# Patient Record
Sex: FEMALE | Race: WHITE | Employment: FULL TIME | URBAN - METROPOLITAN AREA
[De-identification: names, ages, dates, MRNs, and addresses within clinical notes are randomized per-mention and may not be internally consistent; named-entity substitution may affect disease eponyms.]

---

## 2023-05-31 ENCOUNTER — OFFICE VISIT (OUTPATIENT)
Dept: PODIATRY | Facility: CLINIC | Age: 63
End: 2023-05-31
Payer: COMMERCIAL

## 2023-05-31 VITALS
DIASTOLIC BLOOD PRESSURE: 72 MMHG | WEIGHT: 160 LBS | HEIGHT: 68 IN | SYSTOLIC BLOOD PRESSURE: 110 MMHG | BODY MASS INDEX: 24.25 KG/M2

## 2023-05-31 DIAGNOSIS — M72.2 PLANTAR FASCIITIS, LEFT: ICD-10-CM

## 2023-05-31 DIAGNOSIS — M21.42 PES PLANUS OF LEFT FOOT: ICD-10-CM

## 2023-05-31 DIAGNOSIS — M79.672 LEFT FOOT PAIN: Primary | ICD-10-CM

## 2023-05-31 PROCEDURE — 99203 OFFICE O/P NEW LOW 30 MIN: CPT | Performed by: PODIATRIST

## 2023-05-31 RX ORDER — DICLOFENAC SODIUM 75 MG/1
75 TABLET, DELAYED RELEASE ORAL 2 TIMES DAILY
Qty: 28 TABLET | Refills: 0 | Status: SHIPPED | OUTPATIENT
Start: 2023-05-31 | End: 2023-06-14

## 2023-05-31 NOTE — LETTER
5/31/2023         RE: Krys Martinez  [address]  [facility] [address]        Dear Colleague,    Thank you for referring your patient, Krys Martinez, to the [facility]. Please see a copy of my visit note below.    PATIENT HISTORY: Krys Martinez is a 62 year old female who presents to clinic for pain to the left plantar medial heel.  Notes its been going on for a few weeks.  She had Planter fasciitis years ago and had custom inserts which seem to help a lot.  She has not been able to find them as she has not been wearing them for quite a while since the pain went away.  Pain can be 7 out of 10 at its worst.  Denies specific injury.  Wondering what can be done for the heel pain.    Review of Systems:  Patient denies fever, chills, rash, wound, stiffness,  numbness, weakness, heart burn, blood in stool, chest pain with activity, calf pain when walking, shortness of breath with activity, chronic cough, easy bleeding/bruising, swelling of ankles, excessive thirst, fatigue, depression, anxiety.  Patient admits to limping.     PAST MEDICAL HISTORY: No past medical history on file.     PAST SURGICAL HISTORY: No past surgical history on file.     MEDICATIONS: No current outpatient medications on file.     ALLERGIES:  Not on File     SOCIAL HISTORY:   Social History     Socioeconomic History     Marital status:      Spouse name: Not on file     Number of children: Not on file     Years of education: Not on file     Highest education level: Not on file   Occupational History     Not on file   Tobacco Use     Smoking status: Not on file     Smokeless tobacco: Not on file   Substance and Sexual Activity     Alcohol use: Not on file     Drug use: Not on file     Sexual activity: Not on file   Other Topics Concern     Not on file   Social History Narrative     Not on file     Social Determinants of Health     Financial Resource Strain: Not on file   Food Insecurity: Not on  file   Transportation Needs: Not on file   Physical Activity: Not on file   Stress: Not on file   Social Connections: Not on file   Intimate Partner Violence: Not on file   Housing Stability: Not on file        FAMILY HISTORY: No family history on file.     EXAM:Vitals: There were no vitals taken for this visit.  BMI= There is no height or weight on file to calculate BMI.      General appearance: Patient is alert and fully cooperative with history & exam.  No sign of distress is noted during the visit.     Psychiatric: Affect is pleasant & appropriate.  Patient appears motivated to improve health.     Respiratory: Breathing is regular & unlabored while sitting.     HEENT: Hearing is intact to spoken word.  Speech is clear.  No gross evidence of visual impairment that would impact ambulation.     Dermatologic: Skin is intact to both lower extremities without significant lesions, rash or abrasion.  No paronychia or evidence of soft tissue infection is noted.     Vascular: DP & PT pulses are intact & regular bilaterally.  No significant edema or varicosities noted.  CFT and skin temperature is normal to both lower extremities.     Neurologic: Lower extremity sensation is intact to light touch.  No evidence of weakness or contracture in the lower extremities.  No evidence of neuropathy.     Musculoskeletal: Patient is ambulatory without assistive device or brace.  Decrease arch height. Pain on palpation of the left plantar medial heel.    ASSESSMENT:    Left foot pain  Plantar fasciitis, left  Pes planus of left foot       Medical Decision Making/Plan:  Reviewed patient's chart in [facility]. The potential causes and nature of plantar fasciitis were discussed with the patient.  We reviewed the natural history/prognosis of the condition and risks if left untreated.  These include chronic pain, other sites of pain due to gait changes, and potential plantar fascial rupture.      We discussed possible causes of the condition as  it relates to the patients specific situation.      Conservative treatment options were reviewed:  appropriate shoes, avoidance of barefoot walking, inserts/orthoses, stretching, ice, massage, immobilization and NSAIDs.     We also reviewed the options of injection therapy and surgery.  However, it was made clear that surgery is only considered when conservative therapy fails.  The risks and benefits of injection therapy, and surgery were discussed.     After thorough discussion and answering all questions, the patient elected to try custom inserts.  She was given an order for this.  She had them years ago and they helped a lot.  She will also continue doing stretching and icing.  We will try an stronger oral anti-inflammatory for short course to see if this helps knock down some of the inflammation.  Recommend not going barefoot or in socks and having supportive shoe or sandal on at all times.    If pain continues we can always try an injection or boot.  All questions were answered to patient's satisfaction and she will call further questions or concerns..       Patient risk factor: Patient is at low risk for infection  .        Patrizia Shaver DPM, Podiatry/Foot and Ankle Surgery        Again, thank you for allowing me to participate in the care of your patient.        Sincerely,        Patrizia Shaver DPM, Podiatry/Foot and Ankle Surgery

## 2023-05-31 NOTE — PATIENT INSTRUCTIONS
Thank you for choosing [facility] Podiatry / Foot & Ankle Surgery!    DR CORTES'S CLINIC:  [facility]   [address]   [place] [address]      TRIAGE LINE: [phone]  APPOINTMENTS: [phone]  RADIOLOGY: [phone]  SET UP SURGERY: [phone]  PHYSICAL THERAPY: [phone]   FAX NUMBER: [phone]  BILLING QUESTIONS: [phone]       Follow up: Call in 4-6 weeks if not improved    Super Feet- Green    PLANTAR FASCIITIS  Plantar fasciitis is often referred to as heel spurs or heel pain. Plantar fasciitis is a very common problem that affects people of all foot shapes, age, weight and activity level. Pain may be in the arch or on the weight-bearing surface of the heel. The pain may come on without injury or identifiable cause. Pain is generally present when first getting out of bed in the morning or up from a seated break.     CAUSES  The plantar fascia is a dense fibrous band of tissue that stretches across the bottom surface of the foot. The fascia helps support the foot muscles and arch. Plantar fasciitis is thought to be caused by mechanical strain or overload. Frequent walking without shoes or wearing unsupportive shoes is thought to cause structural overload and ultimately inflammation of the plantar fascia. Some people have heel spurs that can be seen on x-ray. The heel spur is actually a minor component of plantar fascitis and is largely ignored.       SELF TREATMENT   The easiest solution is to stop walking around your home without shoes. Plantar fasciitis is largely a shoe problem. Shoes are either not being worn often enough or your current shoes are inadequate for your weight, foot structure or activity level. The majority of shoes on the market today are not sufficient to resist development of plantar fasciitis or to promote healing. Assume that your current shoes are inadequate and will need to be replaced. Even high quality shoes wear out with 6 months to  one year of frequent use. Weight loss is another option. Losing ten pounds in the next two months may be enough to resolve the problem. Ice applied to the area of pain two to three times per day for ten minutes each session can be very helpful. Warm foot soaks in epsom salts can also relieve pain. This should continue until the problem resolves. Achilles tendon stretching is essential. Stretch multiple times daily to promote healing and to prevent recurrence in the future. Over all stretching of the body is helpful as well such as the calves, thighs and lower back. Normally when one area of the body is tight, other areas are too. Gentle Yoga can be good for this.     Over the counter topical anti inflammatories can be helpful such as biofreeze, bengay, salon pas, ect...  Oral ibuprofen or aleve is recommended as well to try to calm down inflammation.     Night splints can be helpful to gradually stretch the foot at night as a lot of pain is when you get up in the morning. Taking a towel or thera band and stretching the foot back multiple times before you get ou of bed can be beneficial as well.     MEDICAL TREATMENT  Medical treatments often include custom arch supports, cortisone injections, physical therapy, splints to be worn in bed, prescription medications and surgery. The home treatments listed above will be necessary regardless of these advanced medical treatments. Surgery is rarely needed but is very helpful in selected cases.     PROGNOSIS  Plantar fasciitis can last from one day to a lifetime. Some people get intermittent fascitis that is very short-lived. Others suffer daily for years. Excessive body weight, frequent bare foot walking, long hours on the feet, inadequate shoes, predisposing foot structures and excessive activity such as running are all potential issues that lead to chronic and/or recurring plantar fascitis. Having plantar fasciitis means that you are forever prone to this problem and will  require modification of some of the above factors. Most people seek treatment within one to four months. Healing usually requires a similar one to four month time frame. Healing time is relative to the amount of effort spent treating the problem.   Plantar fasciitis is highly recurrent. Risk factors often continue, including return to bare foot walking, inadequate shoes, excessive body weight, excessive activities, etc. Your life style and foot structure may predispose you to recurrent plantar fasciitis. A daily prevention regimen can be very helpful. Ongoing use of shoe inserts, careful attention to appropriate shoes, daily Achilles stretching, etc. may prevent recurrence. Prompt attention at the earliest warning signs of heel pain can resolve the problem in as short as a few days.     EXERCISES  Stair Exercise: Step on the stairs with the ball of your foot and hold your position for at least 15 seconds, then slowly step down with the heels of your foot. You can do this daily and as often as you want.   Picking the Towel: Sit comfortably and then pick the towel up with your toes. You can use any object other than a towel as long as the material can be soft and you can pick it up with your toes.  Rolling the Bottle: Use a small ball or frozen water bottle and then roll it around with your foot.   Flex the Toes: Sit comfortably and then flex your toes by pointing it towards the floor or towards your body. This will relax and flex your foot and exercise your plantar fascia, the calf, and the Achilles tendon. The inability of the foot to stretch often causes the bunching up of the plantar fascia area leading to the pain.  Calf/Achilles Stretching: Lay on you back and raise one foot, then point your toes towards the floor. See photo below:               Hold each stretch for 10 seconds. Stretch 10 times per set, three sets per day. Morning, afternoon and evening. If your heel pain is very severe in the morning, consider  doing the first set of stretches before you get out of bed.      OVER THE COUNTER INSERT RECOMMENDATIONS  SuperFeet   Sofsole Fit Spenco   Power Step   Walk-Fit Arch Cradles     Most of these can be found at your local [employer], or online.  **A good high quality over the counter insert should cost around $40-$50      [facility] [employer] LOCATIONS  [place]  [address]  [phone]   [place] [address]  [phone] [place]  [address]  [phone]   [place]  [address]  [phone]   [place]  [address]  [phone] [place] [address]  [phone]   [place]  [address]  [phone]   [place]  [address] [place] [address]  [phone] [place]  [address]  [phone]      [facility]  [address]  [place] [address]  Phone: [phone]  Fax: [phone] [facility]  [address]  [place] [address]  Phone: [phone]   Fax: [phone]   [facility]  [address]  [place] [address]  Phone: [phone]  Fax: [phone] [facility]  [address]  [place] [address]  Phone: [phone]   Fax: [phone]   [facility]   [address]  [place] [address]  Phone: [phone]  Fax: [phone] * Please call any location listed to make an appointment for a casting/fitting. Your referral was sent to their central office and they will all have the order on file.

## 2023-05-31 NOTE — PROGRESS NOTES
PATIENT HISTORY: Krys Martinez is a 62 year old female who presents to clinic for pain to the left plantar medial heel.  Notes its been going on for a few weeks.  She had Planter fasciitis years ago and had custom inserts which seem to help a lot.  She has not been able to find them as she has not been wearing them for quite a while since the pain went away.  Pain can be 7 out of 10 at its worst.  Denies specific injury.  Wondering what can be done for the heel pain.    Review of Systems:  Patient denies fever, chills, rash, wound, stiffness,  numbness, weakness, heart burn, blood in stool, chest pain with activity, calf pain when walking, shortness of breath with activity, chronic cough, easy bleeding/bruising, swelling of ankles, excessive thirst, fatigue, depression, anxiety.  Patient admits to limping.     PAST MEDICAL HISTORY: No past medical history on file.     PAST SURGICAL HISTORY: No past surgical history on file.     MEDICATIONS: No current outpatient medications on file.     ALLERGIES:  Not on File     SOCIAL HISTORY:   Social History     Socioeconomic History     Marital status:      Spouse name: Not on file     Number of children: Not on file     Years of education: Not on file     Highest education level: Not on file   Occupational History     Not on file   Tobacco Use     Smoking status: Not on file     Smokeless tobacco: Not on file   Substance and Sexual Activity     Alcohol use: Not on file     Drug use: Not on file     Sexual activity: Not on file   Other Topics Concern     Not on file   Social History Narrative     Not on file     Social Determinants of Health     Financial Resource Strain: Not on file   Food Insecurity: Not on file   Transportation Needs: Not on file   Physical Activity: Not on file   Stress: Not on file   Social Connections: Not on file   Intimate Partner Violence: Not on file   Housing Stability: Not on file        FAMILY HISTORY: No family history on file.      EXAM:Vitals: There were no vitals taken for this visit.  BMI= There is no height or weight on file to calculate BMI.      General appearance: Patient is alert and fully cooperative with history & exam.  No sign of distress is noted during the visit.     Psychiatric: Affect is pleasant & appropriate.  Patient appears motivated to improve health.     Respiratory: Breathing is regular & unlabored while sitting.     HEENT: Hearing is intact to spoken word.  Speech is clear.  No gross evidence of visual impairment that would impact ambulation.     Dermatologic: Skin is intact to both lower extremities without significant lesions, rash or abrasion.  No paronychia or evidence of soft tissue infection is noted.     Vascular: DP & PT pulses are intact & regular bilaterally.  No significant edema or varicosities noted.  CFT and skin temperature is normal to both lower extremities.     Neurologic: Lower extremity sensation is intact to light touch.  No evidence of weakness or contracture in the lower extremities.  No evidence of neuropathy.     Musculoskeletal: Patient is ambulatory without assistive device or brace.  Decrease arch height. Pain on palpation of the left plantar medial heel.    ASSESSMENT:    Left foot pain  Plantar fasciitis, left  Pes planus of left foot       Medical Decision Making/Plan:  Reviewed patient's chart in [facility]. The potential causes and nature of plantar fasciitis were discussed with the patient.  We reviewed the natural history/prognosis of the condition and risks if left untreated.  These include chronic pain, other sites of pain due to gait changes, and potential plantar fascial rupture.      We discussed possible causes of the condition as it relates to the patients specific situation.      Conservative treatment options were reviewed:  appropriate shoes, avoidance of barefoot walking, inserts/orthoses, stretching, ice, massage, immobilization and NSAIDs.     We also reviewed the options of  injection therapy and surgery.  However, it was made clear that surgery is only considered when conservative therapy fails.  The risks and benefits of injection therapy, and surgery were discussed.     After thorough discussion and answering all questions, the patient elected to try custom inserts.  She was given an order for this.  She had them years ago and they helped a lot.  She will also continue doing stretching and icing.  We will try an stronger oral anti-inflammatory for short course to see if this helps knock down some of the inflammation.  Recommend not going barefoot or in socks and having supportive shoe or sandal on at all times.    If pain continues we can always try an injection or boot.  All questions were answered to patient's satisfaction and she will call further questions or concerns..       Patient risk factor: Patient is at low risk for infection  .        Patrizia Shaver DPM, Podiatry/Foot and Ankle Surgery

## 2024-02-01 ENCOUNTER — OFFICE VISIT (OUTPATIENT)
Dept: PODIATRY | Facility: CLINIC | Age: 64
End: 2024-02-01
Payer: COMMERCIAL

## 2024-02-01 ENCOUNTER — ANCILLARY PROCEDURE (OUTPATIENT)
Dept: GENERAL RADIOLOGY | Facility: CLINIC | Age: 64
End: 2024-02-01
Attending: PODIATRIST
Payer: COMMERCIAL

## 2024-02-01 VITALS
BODY MASS INDEX: 24.25 KG/M2 | HEIGHT: 68 IN | SYSTOLIC BLOOD PRESSURE: 118 MMHG | WEIGHT: 160 LBS | DIASTOLIC BLOOD PRESSURE: 72 MMHG

## 2024-02-01 DIAGNOSIS — M72.2 PLANTAR FASCIITIS, LEFT: ICD-10-CM

## 2024-02-01 DIAGNOSIS — M79.672 LEFT FOOT PAIN: ICD-10-CM

## 2024-02-01 DIAGNOSIS — M79.672 LEFT FOOT PAIN: Primary | ICD-10-CM

## 2024-02-01 DIAGNOSIS — M21.42 BILATERAL PES PLANUS: ICD-10-CM

## 2024-02-01 DIAGNOSIS — M21.41 BILATERAL PES PLANUS: ICD-10-CM

## 2024-02-01 PROCEDURE — 99213 OFFICE O/P EST LOW 20 MIN: CPT | Performed by: PODIATRIST

## 2024-02-01 PROCEDURE — 73630 X-RAY EXAM OF FOOT: CPT | Mod: TC | Performed by: RADIOLOGY

## 2024-02-01 NOTE — LETTER
"    2/1/2024         RE: Krys Martinez  [address]  [facility] [address]        Dear Colleague,    Thank you for referring your patient, Krys Martinez, to the [facility]OD[facility]. Please see a copy of my visit note below.    Podiatry / Foot and Ankle Surgery Progress Note    February 1, 2024    Subject: Patient was seen for continued left foot pain.  Notes that she has been doing the stretches and doing shockwave therapy and wearing the inserts but she still has a dull ache.  She is wondering if there is anything else going on with the heel.    Objective:  Vitals: /72   Ht 1.727 m (5' 8\")   Wt 72.6 kg (160 lb)   BMI 24.33 kg/m        General:  Patient is alert and orientated.  NAD.    Dermatologic: Skin is intact to both lower extremities without significant lesions, rash or abrasion.  No paronychia or evidence of soft tissue infection is noted.     Vascular: DP & PT pulses are intact & regular bilaterally.  No significant edema or varicosities noted.  CFT and skin temperature is normal to both lower extremities.     Neurologic: Lower extremity sensation is intact to light touch.  No evidence of weakness or contracture in the lower extremities.  No evidence of neuropathy.     Musculoskeletal: Patient is ambulatory without assistive device or brace.  Decrease arch height. Pain on palpation of the left plantar medial heel.    Radiographs: Left foot x-ray- I have looked at and reviewed xrays personally -decreased calcaneal inclination angle.  Small plantar heel spur is noted.  No fractures are noted.     ASSESSMENT:    Left foot pain  Plantar fasciitis, left  Pes planus of left foot     Medical Decision Making/Plan:  Reviewed patient's chart in [facility].  Reviewed and discussed x-rays.  Discussed that the bone spur is a radiographic finding of plantar fasciitis.  I would recommend an MRI to make sure there are no plantar fascial tears or any soft tissue masses in the area that " could still be contributing to patient's pain.  We will call or MyChart her with the results.  We discussed that if it is just plantar fasciitis we could try cortisone injection to see if this would help calm it down or we discussed surgically going in and cutting the band.  She would like to hold off on surgery if possible.     All questions were answered to patient's satisfaction and she will call further questions or concerns..         Patient risk factor: Patient is at low risk for infection    Patrizia Shaver DPM, Podiatry/Foot and Ankle Surgery      Again, thank you for allowing me to participate in the care of your patient.        Sincerely,        Patrizia Shaver DPM, Podiatry/Foot and Ankle Surgery

## 2024-02-01 NOTE — PATIENT INSTRUCTIONS
Thank you for choosing [facility] Podiatry / Foot & Ankle Surgery!    DR CORTES'S CLINIC:  [facility]   [address]   [place] [address]      TRIAGE LINE: [phone]  APPOINTMENTS: [phone]  RADIOLOGY: [phone]  SET UP SURGERY: [phone]  PHYSICAL THERAPY: [phone]   FAX NUMBER: [phone]  BILLING QUESTIONS: [phone]       Follow up: Will call or My Chart with MRI results      Please call to schedule your MRI/CT/Ultrasound/Arthrogram appointment.  The number is [phone].

## 2024-02-19 ENCOUNTER — HOSPITAL ENCOUNTER (OUTPATIENT)
Dept: MRI IMAGING | Facility: CLINIC | Age: 64
Discharge: HOME | End: 2024-02-19
Attending: PODIATRIST | Admitting: PODIATRIST
Payer: COMMERCIAL

## 2024-02-19 DIAGNOSIS — M21.41 BILATERAL PES PLANUS: ICD-10-CM

## 2024-02-19 DIAGNOSIS — M79.672 LEFT FOOT PAIN: ICD-10-CM

## 2024-02-19 DIAGNOSIS — M72.2 PLANTAR FASCIITIS, LEFT: ICD-10-CM

## 2024-02-19 DIAGNOSIS — M21.42 BILATERAL PES PLANUS: ICD-10-CM

## 2024-02-19 PROCEDURE — 73721 MRI JNT OF LWR EXTRE W/O DYE: CPT | Mod: 26 | Performed by: RADIOLOGY

## 2024-02-19 PROCEDURE — 73721 MRI JNT OF LWR EXTRE W/O DYE: CPT | Mod: LT

## 2024-02-20 ENCOUNTER — MYC MEDICAL ADVICE (OUTPATIENT)
Dept: PODIATRY | Facility: CLINIC | Age: 64
End: 2024-02-20
Payer: COMMERCIAL

## 2024-02-20 DIAGNOSIS — M79.672 LEFT FOOT PAIN: Primary | ICD-10-CM

## 2024-02-20 DIAGNOSIS — M72.2 PLANTAR FASCIITIS, LEFT: ICD-10-CM

## 2024-02-20 NOTE — TELEPHONE ENCOUNTER
Called and spoke to patient to relay the MRI findings.     Patient would like to start with physical therapy. Order pended for provider review and signature.     Patient will see how PT goes and then decide if she would like to pursue an injection.     Ewa Kang, RICHARD, LAT, ATC  Certified Athletic Trainer

## 2024-02-20 NOTE — TELEPHONE ENCOUNTER
Patient was provided with PT scheduling number during last number.     Patient will call the number by the end of the week if she does not hear from the scheduling team.     Ewa Kang, RICHARD, LAT, ATC  Certified Athletic Trainer

## 2024-02-20 NOTE — TELEPHONE ENCOUNTER
MRI of the left ankle just shows planter fasciitis. No tears. Could try physical therapy or possible injection. If she would like physical therapy I can order.  If she would like to try an injection we would have her follow-up in clinic.    Please let patient know.    Thanks,     Patrizia Shaver DPM

## 2024-02-26 ENCOUNTER — THERAPY VISIT (OUTPATIENT)
Dept: PHYSICAL THERAPY | Facility: CLINIC | Age: 64
End: 2024-02-26
Attending: PODIATRIST
Payer: COMMERCIAL

## 2024-02-26 DIAGNOSIS — M72.2 PLANTAR FASCIITIS, LEFT: ICD-10-CM

## 2024-02-26 DIAGNOSIS — M79.672 LEFT FOOT PAIN: ICD-10-CM

## 2024-02-26 PROCEDURE — 97530 THERAPEUTIC ACTIVITIES: CPT | Mod: GP | Performed by: PHYSICAL THERAPIST

## 2024-02-26 PROCEDURE — 97110 THERAPEUTIC EXERCISES: CPT | Mod: GP | Performed by: PHYSICAL THERAPIST

## 2024-02-26 PROCEDURE — 97161 PT EVAL LOW COMPLEX 20 MIN: CPT | Mod: GP | Performed by: PHYSICAL THERAPIST

## 2024-02-26 NOTE — PROGRESS NOTES
PHYSICAL THERAPY EVALUATION  Type of Visit: Evaluation    See electronic medical record for Abuse and Falls Screening details.    Subjective       Presenting condition or subjective complaint:    Date of onset: 02/26/24    Relevant medical history:   Plantar fascitis and heel spurs   Dates & types of surgery:  3C sections 1992, 1994, 1999    Prior diagnostic imaging/testing results:     MRI and Xray   Prior therapy history for the same diagnosis, illness or injury:    Estim therapy.     Living Environment  Social support:   Lives with family  Type of home:   House multilevel with stairs     Employment:    Yes, [employer] in sales   Hobbies/Interests:  Golf, kayak, pickleball    Patient goals for therapy:  Return to pickle ball and walking w/o limitations.     Subjective: Pt had plantar fascitis in 2010, went away, then May 2023 went on walks and it was hilly and maira. Pain in L foot, all summer, stopped pickleball, golf seemed okay. Used inserts. Heel really tender, had heel spur on xray, inflammation present on MRI. At first pain in arch, but now 24/7 in the heel ache. Stretching daily. Inserts helpful, heel cushions helpful, uses night splint. Worseners: impact, first few steps in the morning, barefoot, walking a long ways, running. Using volatrin and biofreeze and that not helping. Pain level: 4/10     Objective   ANKLE:  Standing Posture: L: more supinated, inversion in calcaneous. R: more pronated, less inversion at calcaneous.     ROM/Strength: (* indicates patient's pain)   AROM L AROM R MMT L MMT R   Dorsiflexion 5 6 4+/5 5/5   Plantarflexion 55 70 SL heel raises 10/10   Pt noted felt less powerful. SL heel raises 10/10   Inversion (prone)   3+/5* 5/5   Eversion (prone)   4+/5 5/5   G Toe Ext   3/5 3/5   G Toe Flex   2/5 2/5       Assessment & Plan   CLINICAL IMPRESSIONS  Medical Diagnosis: Left foot pain (M79.672), Plantar fasciitis, left (M72.2)    Treatment Diagnosis: L foot pain w/ strength deficits, plantar  fascitis, left post tib weakness   Impression/Assessment: Patient is a 63 year old female with L foot pain complaints.  The following significant findings have been identified: Pain, Decreased ROM/flexibility, Decreased joint mobility, Decreased strength, Impaired balance, Impaired gait, Impaired muscle performance, Decreased activity tolerance, and Impaired posture. These impairments interfere with their ability to perform self care tasks, work tasks, recreational activities, and community mobility as compared to previous level of function.     Clinical Decision Making (Complexity):  Clinical Presentation: Stable/Uncomplicated  Clinical Presentation Rationale: based on medical and personal factors listed in PT evaluation  Clinical Decision Making (Complexity): Low complexity    PLAN OF CARE  Treatment Interventions:  Interventions: Gait Training, Manual Therapy, Neuromuscular Re-education, Therapeutic Activity, Therapeutic Exercise    Long Term Goals     PT Goal 1  Goal Identifier: Goal 1  Goal Description: Pt will be able to walk 30 min, painfree, in the community  Rationale: to maximize safety and independence within the community  Target Date: 04/22/24      Frequency of Treatment: 1/wk  Duration of Treatment: 8wks    Education Assessment:   Learner/Method: Patient;Pictures/Video;No Barriers to Learning    Risks and benefits of evaluation/treatment have been explained.   Patient/Family/caregiver agrees with Plan of Care.     Evaluation Time:     PT Eval, Low Complexity Minutes (50761): 20     Signing Clinician: Danae Bradford PT

## 2024-03-01 ENCOUNTER — THERAPY VISIT (OUTPATIENT)
Dept: PHYSICAL THERAPY | Facility: CLINIC | Age: 64
End: 2024-03-01
Payer: COMMERCIAL

## 2024-03-01 DIAGNOSIS — M79.672 LEFT FOOT PAIN: Primary | ICD-10-CM

## 2024-03-01 DIAGNOSIS — M72.2 PLANTAR FASCIITIS, LEFT: ICD-10-CM

## 2024-03-01 PROCEDURE — 97530 THERAPEUTIC ACTIVITIES: CPT | Mod: GP | Performed by: PHYSICAL THERAPIST

## 2024-03-01 PROCEDURE — 97110 THERAPEUTIC EXERCISES: CPT | Mod: GP | Performed by: PHYSICAL THERAPIST

## 2024-03-11 ENCOUNTER — THERAPY VISIT (OUTPATIENT)
Dept: PHYSICAL THERAPY | Facility: CLINIC | Age: 64
End: 2024-03-11
Payer: COMMERCIAL

## 2024-03-11 DIAGNOSIS — M72.2 PLANTAR FASCIITIS, LEFT: ICD-10-CM

## 2024-03-11 DIAGNOSIS — M79.672 LEFT FOOT PAIN: Primary | ICD-10-CM

## 2024-03-11 PROCEDURE — 97110 THERAPEUTIC EXERCISES: CPT | Mod: GP | Performed by: PHYSICAL THERAPIST

## 2024-03-11 PROCEDURE — 97530 THERAPEUTIC ACTIVITIES: CPT | Mod: GP | Performed by: PHYSICAL THERAPIST

## 2024-03-18 ENCOUNTER — THERAPY VISIT (OUTPATIENT)
Dept: PHYSICAL THERAPY | Facility: CLINIC | Age: 64
End: 2024-03-18
Payer: COMMERCIAL

## 2024-03-18 DIAGNOSIS — M79.672 LEFT FOOT PAIN: Primary | ICD-10-CM

## 2024-03-18 PROCEDURE — 97110 THERAPEUTIC EXERCISES: CPT | Mod: GP | Performed by: PHYSICAL THERAPIST

## 2024-03-25 ENCOUNTER — THERAPY VISIT (OUTPATIENT)
Dept: PHYSICAL THERAPY | Facility: CLINIC | Age: 64
End: 2024-03-25
Payer: COMMERCIAL

## 2024-03-25 DIAGNOSIS — M72.2 PLANTAR FASCIITIS, LEFT: ICD-10-CM

## 2024-03-25 DIAGNOSIS — M79.672 LEFT FOOT PAIN: Primary | ICD-10-CM

## 2024-03-25 PROCEDURE — 97110 THERAPEUTIC EXERCISES: CPT | Mod: GP | Performed by: PHYSICAL THERAPIST

## 2024-03-25 PROCEDURE — 97530 THERAPEUTIC ACTIVITIES: CPT | Mod: GP | Performed by: PHYSICAL THERAPIST

## 2024-04-05 ENCOUNTER — THERAPY VISIT (OUTPATIENT)
Dept: PHYSICAL THERAPY | Facility: CLINIC | Age: 64
End: 2024-04-05
Payer: COMMERCIAL

## 2024-04-05 DIAGNOSIS — M79.672 LEFT FOOT PAIN: Primary | ICD-10-CM

## 2024-04-05 DIAGNOSIS — M72.2 PLANTAR FASCIITIS, LEFT: ICD-10-CM

## 2024-04-05 PROCEDURE — 97140 MANUAL THERAPY 1/> REGIONS: CPT | Mod: GP | Performed by: PHYSICAL THERAPIST

## 2024-04-05 NOTE — PROGRESS NOTES
PLAN  Return to Dr. Shaver for further care/reassessment    Beginning/End Dates of Progress Note Reporting Period:    to 04/05/2024    Referring Provider:  Patrizia Shaver     04/05/24 0500   Appointment Info   Signing clinician's name / credentials Danae Bradford, PT, Cert MDT   Total/Authorized Visits E&T   Visits Used 6   Medical Diagnosis Left foot pain (M79.672), Plantar fasciitis, left (M72.2)   PT Tx Diagnosis L foot pain w/ strength deficits, plantar fascitis, left post tib weakness   Progress Note/Certification   Onset of illness/injury or Date of Surgery 02/26/24   Therapy Frequency 1/wk   Predicted Duration 8wks   PT Goal 1   Goal Identifier Goal 1   Goal Description Pt will be able to walk 30 min, painfree, in the community   Rationale to maximize safety and independence within the community   Goal Progress unable to progress due to persisting pain   Target Date 04/22/24   Subjective Report   Subjective Report was gone to [place] and went without the tape on--managed but had heel pain;  stiff in am, gets better as she gets moving.  Feels overall she is at a plateau--good compliance with HEP.   Objective Measures   Objective Measures Objective Measure 1;Objective Measure 2;Objective Measure 3;Objective Measure 4   Objective Measure 1   Objective Measure repeated movement testing   Details no further response;   Objective Measure 2   Objective Measure Palpation   Details decreased tenderness medial arch   Objective Measure 3   Objective Measure MMT   Details post tib=4+/5, L quad=4+/5   Objective Measure 4   Objective Measure Gait   Details midfoot striking on L side, heel elevated, knee flexion   Treatment Interventions (PT)   Interventions Therapeutic Activity;Therapeutic Procedure/Exercise;Manual Therapy   Therapeutic Procedure/Exercise   Therapeutic Procedures Ther Proc 2;Ther Proc 3;Ther Proc 4;Ther Proc 5;Ther Proc 6;Ther Proc 7   Ther Proc 1 Repeated PF w/ pt OP   Ther Proc 1 - Details NE/NE/NE on  baselines   Ther Proc 2 Big toe presses into Red theraband   Ther Proc 2 - Details 1x10   Ther Proc 3 Ankle alphabet   Ther Proc 3 - Details 1x20 each side   Ther Proc 4 Foot yoga   Ther Proc 4 - Details reviewed   Ther Proc 5 foot doming   Ther Proc 5 - Details reviewed   Ther Proc 6 DL and SL calf raise   Ther Proc 6 - Details 1x10   Ther Proc 7 rep DF in chair   Ther Proc 7 - Details 3 x 12: decr/B/decrased pain with standing, decreased pain with walking   PTRx Ther Proc 1 SLR Flexion   PTRx Ther Proc 1 - Details x 30   PTRx Ther Proc 2 Foot Yoga   PTRx Ther Proc 2 - Details placed towel under L foot toes to assist in toe flexionx 20   PTRx Ther Proc 3 Towel Gather   PTRx Ther Proc 3 - Details vc to add weight to end of towel   PTRx Ther Proc 4 ankle INV   PTRx Ther Proc 4 - Details gn tband x 30   PTRx Ther Proc 5 Creep walking   PTRx Ther Proc 5 - Details 2 x MARTIN forward/backward   PTRx Ther Proc 6 Toe Raises   PTRx Ther Proc 6 - Details use of ball x 30 w/vc for full range of motion   Skilled Intervention VC and MC for motion   Patient Response/Progress pt noted decrease in symptoms   Therapeutic Activity   Ther Act 1 Low Dye taping, foot   Ther Act 1 - Details pt noted decreased pain in heel after   Skilled Intervention Taping to reduce pain and facilitate movement   Patient Response/Progress pt noted decrease in pain in amb   Neuromuscular Re-education   PTRx Neuro Re-ed 1 Balance Single Leg Stance Supported and Unsupported   PTRx Neuro Re-ed 1 - Details mc and vc for body positioning; level hips lower abd activation   Manual Therapy   Manual Therapy: Mobilization, MFR, MLD, friction massage minutes (51325) 25   Manual Therapy 1 tool assisted STM   Manual Therapy 1 - Details to plantar surface including PF, instrinsic mm   Education   Learner/Method Patient;Pictures/Video;No Barriers to Learning   Plan   Home program PTRx videos   Plan for next session return back to physician for further care   Total  Session Time   Timed Code Treatment Minutes 25   Total Treatment Time (sum of timed and untimed services) 25

## 2024-04-14 ENCOUNTER — HEALTH MAINTENANCE LETTER (OUTPATIENT)
Age: 64
End: 2024-04-14

## 2024-04-18 ENCOUNTER — OFFICE VISIT (OUTPATIENT)
Dept: PODIATRY | Facility: CLINIC | Age: 64
End: 2024-04-18
Payer: COMMERCIAL

## 2024-04-18 VITALS — WEIGHT: 160 LBS | SYSTOLIC BLOOD PRESSURE: 118 MMHG | BODY MASS INDEX: 24.33 KG/M2 | DIASTOLIC BLOOD PRESSURE: 78 MMHG

## 2024-04-18 DIAGNOSIS — M21.41 PES PLANUS OF BOTH FEET: ICD-10-CM

## 2024-04-18 DIAGNOSIS — M21.42 PES PLANUS OF BOTH FEET: ICD-10-CM

## 2024-04-18 DIAGNOSIS — M79.672 LEFT FOOT PAIN: Primary | ICD-10-CM

## 2024-04-18 DIAGNOSIS — M72.2 PLANTAR FASCIITIS, LEFT: ICD-10-CM

## 2024-04-18 PROCEDURE — 20550 NJX 1 TENDON SHEATH/LIGAMENT: CPT | Mod: LT | Performed by: PODIATRIST

## 2024-04-18 PROCEDURE — 99213 OFFICE O/P EST LOW 20 MIN: CPT | Mod: 25 | Performed by: PODIATRIST

## 2024-04-18 RX ORDER — BUPIVACAINE HYDROCHLORIDE 5 MG/ML
2 INJECTION, SOLUTION EPIDURAL; INTRACAUDAL
Status: SHIPPED | OUTPATIENT
Start: 2024-04-18

## 2024-04-18 RX ORDER — TRIAMCINOLONE ACETONIDE 40 MG/ML
40 INJECTION, SUSPENSION INTRA-ARTICULAR; INTRAMUSCULAR
Status: SHIPPED | OUTPATIENT
Start: 2024-04-18

## 2024-04-18 RX ADMIN — BUPIVACAINE HYDROCHLORIDE 2 ML: 5 INJECTION, SOLUTION EPIDURAL; INTRACAUDAL at 09:14

## 2024-04-18 RX ADMIN — TRIAMCINOLONE ACETONIDE 40 MG: 40 INJECTION, SUSPENSION INTRA-ARTICULAR; INTRAMUSCULAR at 09:14

## 2024-04-18 NOTE — PROGRESS NOTES
Podiatry / Foot and Ankle Surgery Progress Note    April 18, 2024    Subject: Patient was seen for follow-up on MRI and possible injection in the foot.  Notes that pain is still 5 out of 10 and normally.    Objective:  Vitals: /78   Wt 72.6 kg (160 lb)   BMI 24.33 kg/m      General:  Patient is alert and orientated.  NAD.    Dermatologic: Skin is intact to both lower extremities without significant lesions, rash or abrasion.  No paronychia or evidence of soft tissue infection is noted.     Vascular: DP & PT pulses are intact & regular bilaterally.  No significant edema or varicosities noted.  CFT and skin temperature is normal to both lower extremities.     Neurologic: Lower extremity sensation is intact to light touch.  No evidence of weakness or contracture in the lower extremities.  No evidence of neuropathy.     Musculoskeletal: Patient is ambulatory without assistive device or brace.  Decrease arch height. Pain on palpation of the left plantar medial heel.     Radiographs: Left foot x-ray- I have looked at and reviewed xrays personally -decreased calcaneal inclination angle.  Small plantar heel spur is noted.  No fractures are noted.    MRI left ankle -  Markedly increased intrasubstance signal in the plantar fascia,  it's calcaneal attachment including the calcaneal spur and the  subcutaneous fat pad, consistent with moderate grade plantar  fasciitis.  2. Low-grade tenosynovitis of the peroneus longus tendon and low grade  intrasubstance split tear of the peroneus brevis tendon, which  reconstitutes itself distally.  3. Small ankle joint effusion.     ASSESSMENT:    Left foot pain  Plantar fasciitis, left  Pes planus of left foot     Medical Decision Making/Plan:  Review[facility] in [facility].  Reviewed and discussed MRI results.  At this time patient would like to proceed with injection.  Please see procedure note below.  We will have her wear an ankle brace for the next week to help protect the  plantar fascia while the cortisone takes effect.  Continue to wear the inserts and do the stretches.  If this does not improve we discussed that the next option would likely be surgery.  She would like to avoid this if possible.      All questions were answered to patient's satisfaction and she will call further questions or concerns..      Procedure:Medium Joint Injection/Arthrocentesis: L ankle    Date/Time: 4/18/2024 9:14 AM    Performed by: Patrizia Shaver DPM, Podiatry/Foot and Ankle Surgery  Authorized by: Patrizia Shaver DPM, Podiatry/Foot and Ankle Surgery    Indications:  Pain  Needle Size:  25 G  Guidance: surface landmarks    Approach:  Medial  Location:  Ankle  Location comment:  Left plantar fascial injection  Site:  L ankle  Medications:  40 mg triamcinolone 40 MG/ML; 2 mL BUPivacaine (PF) 0.5 %  Outcome:  Tolerated well, no immediate complications  Procedure discussed: discussed risks, benefits, and alternatives    Consent Given by:  Patient  Timeout: timeout called immediately prior to procedure    Prep: patient was prepped and draped in usual sterile fashion          Patient risk factor: Patient is at low risk for infection    Patrizia Shaver DPM, Podiatry/Foot and Ankle Surgery

## 2024-04-18 NOTE — LETTER
4/18/2024         RE: Krys Martinez  [address] [facility] [id number]        Dear Colleague,    Thank you for referring your patient, Krys Martinez, to the [facility]. Please see a copy of my visit note below.    Podiatry / Foot and Ankle Surgery Progress Note    April 18, 2024    Subject: Patient was seen for follow-up on MRI and possible injection in the foot.  Notes that pain is still 5 out of 10 and normally.    Objective:  Vitals: /78   Wt 72.6 kg (160 lb)   BMI 24.33 kg/m      General:  Patient is alert and orientated.  NAD.    Dermatologic: Skin is intact to both lower extremities without significant lesions, rash or abrasion.  No paronychia or evidence of soft tissue infection is noted.     Vascular: DP & PT pulses are intact & regular bilaterally.  No significant edema or varicosities noted.  CFT and skin temperature is normal to both lower extremities.     Neurologic: Lower extremity sensation is intact to light touch.  No evidence of weakness or contracture in the lower extremities.  No evidence of neuropathy.     Musculoskeletal: Patient is ambulatory without assistive device or brace.  Decrease arch height. Pain on palpation of the left plantar medial heel.     Radiographs: Left foot x-ray- I have looked at and reviewed xrays personally -decreased calcaneal inclination angle.  Small plantar heel spur is noted.  No fractures are noted.    MRI left ankle -  Markedly increased intrasubstance signal in the plantar fascia,  it's calcaneal attachment including the calcaneal spur and the  subcutaneous fat pad, consistent with moderate grade plantar  fasciitis.  2. Low-grade tenosynovitis of the peroneus longus tendon and low grade  intrasubstance split tear of the peroneus brevis tendon, which  reconstitutes itself distally.  3. Small ankle joint effusion.     ASSESSMENT:    Left foot pain  Plantar fasciitis, left  Pes planus of left foot     Medical  Decision Making/Plan:  Reviewed patient's chart in [facility].  Reviewed and discussed MRI results.  At this time patient would like to proceed with injection.  Please see procedure note below.  We will have her wear an ankle brace for the next week to help protect the plantar fascia while the cortisone takes effect.  Continue to wear the inserts and do the stretches.  If this does not improve we discussed that the next option would likely be surgery.  She would like to avoid this if possible.      All questions were answered to patient's satisfaction and she will call further questions or concerns..      Procedure:Medium Joint Injection/Arthrocentesis: L ankle    Date/Time: 4/18/2024 9:14 AM    Performed by: Patrizia Shaver DPM, Podiatry/Foot and Ankle Surgery  Authorized by: Patrizia Shaver DPM, Podiatry/Foot and Ankle Surgery    Indications:  Pain  Needle Size:  25 G  Guidance: surface landmarks    Approach:  Medial  Location:  Ankle  Location comment:  Left plantar fascial injection  Site:  L ankle  Medications:  40 mg triamcinolone 40 MG/ML; 2 mL BUPivacaine (PF) 0.5 %  Outcome:  Tolerated well, no immediate complications  Procedure discussed: discussed risks, benefits, and alternatives    Consent Given by:  Patient  Timeout: timeout called immediately prior to procedure    Prep: patient was prepped and draped in usual sterile fashion          Patient risk factor: Patient is at low risk for infection    Patrizia Shaver DPM, Podiatry/Foot and Ankle Surgery      Again, thank you for allowing me to participate in the care of your patient.        Sincerely,        Patrizia Shaver DPM, Podiatry/Foot and Ankle Surgery

## 2024-04-18 NOTE — PATIENT INSTRUCTIONS
Thank you for choosing [facility] Podiatry / Foot & Ankle Surgery!    DR CORTES'S CLINIC:  [facility]   [address]   [place] [address]      TRIAGE LINE: [phone]  APPOINTMENTS: [phone]  RADIOLOGY: [phone]  SET UP SURGERY: [phone]  PHYSICAL THERAPY: [phone]   FAX NUMBER: [phone]  BILLING QUESTIONS: [phone]       Follow up: As needed

## 2025-03-29 ENCOUNTER — HEALTH MAINTENANCE LETTER (OUTPATIENT)
Age: 65
End: 2025-03-29

## 2025-04-19 ENCOUNTER — HEALTH MAINTENANCE LETTER (OUTPATIENT)
Age: 65
End: 2025-04-19